# Patient Record
Sex: FEMALE | Race: OTHER | HISPANIC OR LATINO | Employment: UNEMPLOYED | URBAN - METROPOLITAN AREA
[De-identification: names, ages, dates, MRNs, and addresses within clinical notes are randomized per-mention and may not be internally consistent; named-entity substitution may affect disease eponyms.]

---

## 2017-04-25 ENCOUNTER — GENERIC CONVERSION - ENCOUNTER (OUTPATIENT)
Dept: OTHER | Facility: OTHER | Age: 62
End: 2017-04-25

## 2017-05-31 ENCOUNTER — GENERIC CONVERSION - ENCOUNTER (OUTPATIENT)
Dept: OTHER | Facility: OTHER | Age: 62
End: 2017-05-31

## 2018-04-19 LAB
ABSOL LYMPHOCYTES (HISTORICAL): 2.6 K/UL (ref 0.5–4)
ALBUMIN SERPL BCP-MCNC: 4.6 G/DL (ref 3–5.2)
ALP SERPL-CCNC: 125 U/L (ref 43–122)
ALT SERPL W P-5'-P-CCNC: 38 U/L (ref 9–52)
AMORPHOUS MATERIAL (HISTORICAL): NORMAL
ANION GAP SERPL CALCULATED.3IONS-SCNC: 12 MMOL/L (ref 5–14)
AST SERPL W P-5'-P-CCNC: 23 U/L (ref 14–36)
BACTERIA UR QL AUTO: NORMAL
BASOPHILS # BLD AUTO: 0 % (ref 0–1)
BASOPHILS # BLD AUTO: 0 K/UL (ref 0–0.1)
BILIRUB SERPL-MCNC: 0.4 MG/DL
BILIRUB UR QL STRIP: NEGATIVE MG/DL
BUN SERPL-MCNC: 10 MG/DL (ref 5–25)
CALCIUM SERPL-MCNC: 9.8 MG/DL (ref 8.4–10.2)
CASTS/CASTS TYPE (HISTORICAL): NORMAL /LPF
CHLORIDE SERPL-SCNC: 103 MEQ/L (ref 97–108)
CLARITY UR: CLEAR
CO2 SERPL-SCNC: 29 MMOL/L (ref 22–30)
COLOR UR: ABNORMAL
CREATINE, SERUM (HISTORICAL): 0.6 MG/DL (ref 0.6–1.2)
CRYSTAL TYPE (HISTORICAL): NORMAL /HPF
DEPRECATED RDW RBC AUTO: 13.7 %
EGFR (HISTORICAL): >60 ML/MIN/1.73 M2
EOSINOPHIL # BLD AUTO: 0.1 K/UL (ref 0–0.4)
EOSINOPHIL NFR BLD AUTO: 1 % (ref 0–6)
ERYTHROCYTE SEDIMENTATION RATE (HISTORICAL): 58 MM (ref 1–20)
GLUCOSE SERPL-MCNC: 91 MG/DL (ref 70–99)
GLUCOSE UR STRIP-MCNC: NEGATIVE MG/DL
HCT VFR BLD AUTO: 43 % (ref 36–46)
HGB BLD-MCNC: 14.2 G/DL (ref 12–16)
HGB UR QL STRIP.AUTO: ABNORMAL
KETONES UR STRIP-MCNC: NEGATIVE MG/DL
LEUKOCYTE ESTERASE UR QL STRIP: ABNORMAL
LYMPHOCYTES NFR BLD AUTO: 30 % (ref 25–45)
MCH RBC QN AUTO: 28.6 PG (ref 26–34)
MCHC RBC AUTO-ENTMCNC: 33.1 % (ref 31–36)
MCV RBC AUTO: 87 FL (ref 80–100)
MONOCYTES # BLD AUTO: 0.5 K/UL (ref 0.2–0.9)
MONOCYTES NFR BLD AUTO: 6 % (ref 1–10)
MUCOUS THREADS URNS QL MICRO: NORMAL
NEUTROPHILS ABS COUNT (HISTORICAL): 5.4 K/UL (ref 1.8–7.8)
NEUTS SEG NFR BLD AUTO: 63 % (ref 45–65)
NITRITE UR QL STRIP: NEGATIVE
NON-SQ EPI CELLS URNS QL MICRO: NORMAL
OTHER STN SPEC: NORMAL
PH UR STRIP.AUTO: 7 [PH] (ref 4.5–8)
PLATELET # BLD AUTO: 351 K/MCL (ref 150–450)
POTASSIUM SERPL-SCNC: 4.3 MEQ/L (ref 3.6–5)
PROT UR STRIP-MCNC: NEGATIVE MG/DL
RBC # BLD AUTO: 4.97 M/MCL (ref 4–5.2)
RBC #/AREA URNS AUTO: 2 /HPF
SODIUM SERPL-SCNC: 143 MEQ/L (ref 137–147)
SP GR UR STRIP.AUTO: 1.01 (ref 1–1.04)
TOTAL PROTEIN (HISTORICAL): 8.6 G/DL (ref 5.9–8.4)
TSH SERPL DL<=0.05 MIU/L-ACNC: 0.74 UIU/ML (ref 0.47–4.68)
UROBILINOGEN UR QL STRIP.AUTO: NEGATIVE MG/DL (ref 0–1)
WBC # BLD AUTO: 8.5 K/MCL (ref 4.5–11)
WBC #/AREA URNS AUTO: 2 /HPF

## 2018-04-20 LAB — GLUCOSE SERPL-MCNC: 110 MG/DL (ref 70–99)

## 2018-04-22 LAB
EST. AVERAGE GLUCOSE BLD GHB EST-MCNC: 123 MG/DL
HBA1C MFR BLD HPLC: 5.9 %

## 2021-12-30 ENCOUNTER — APPOINTMENT (EMERGENCY)
Dept: ULTRASOUND IMAGING | Facility: HOSPITAL | Age: 66
End: 2021-12-30
Payer: MEDICARE

## 2021-12-30 ENCOUNTER — HOSPITAL ENCOUNTER (EMERGENCY)
Facility: HOSPITAL | Age: 66
Discharge: HOME | End: 2021-12-30
Attending: EMERGENCY MEDICINE | Admitting: EMERGENCY MEDICINE
Payer: MEDICARE

## 2021-12-30 VITALS
DIASTOLIC BLOOD PRESSURE: 79 MMHG | HEART RATE: 75 BPM | OXYGEN SATURATION: 98 % | BODY MASS INDEX: 36.36 KG/M2 | WEIGHT: 205.25 LBS | SYSTOLIC BLOOD PRESSURE: 131 MMHG | TEMPERATURE: 98.6 F | RESPIRATION RATE: 18 BRPM

## 2021-12-30 DIAGNOSIS — R10.2 PELVIC PAIN: ICD-10-CM

## 2021-12-30 DIAGNOSIS — R93.89 ABNORMAL FINDING ON ULTRASOUND: ICD-10-CM

## 2021-12-30 DIAGNOSIS — N95.0 POSTMENOPAUSAL BLEEDING: Primary | ICD-10-CM

## 2021-12-30 LAB
ABO GROUP BLD: NORMAL
ALBUMIN SERPL BCP-MCNC: 3.3 G/DL (ref 3.5–5)
ALP SERPL-CCNC: 138 U/L (ref 46–116)
ALT SERPL W P-5'-P-CCNC: 44 U/L (ref 12–78)
ANION GAP SERPL CALCULATED.3IONS-SCNC: 8 MMOL/L (ref 4–13)
APTT PPP: 26 SECONDS (ref 23–37)
AST SERPL W P-5'-P-CCNC: 24 U/L (ref 5–45)
BASOPHILS # BLD AUTO: 0.05 THOUSANDS/ΜL (ref 0–0.1)
BASOPHILS NFR BLD AUTO: 1 % (ref 0–1)
BILIRUB DIRECT SERPL-MCNC: 0.08 MG/DL (ref 0–0.2)
BILIRUB SERPL-MCNC: 0.32 MG/DL (ref 0.2–1)
BLD GP AB SCN SERPL QL: NEGATIVE
BUN SERPL-MCNC: 15 MG/DL (ref 5–25)
CALCIUM SERPL-MCNC: 8.6 MG/DL (ref 8.3–10.1)
CHLORIDE SERPL-SCNC: 107 MMOL/L (ref 100–108)
CO2 SERPL-SCNC: 29 MMOL/L (ref 21–32)
CREAT SERPL-MCNC: 0.84 MG/DL (ref 0.6–1.3)
EOSINOPHIL # BLD AUTO: 0.12 THOUSAND/ΜL (ref 0–0.61)
EOSINOPHIL NFR BLD AUTO: 2 % (ref 0–6)
ERYTHROCYTE [DISTWIDTH] IN BLOOD BY AUTOMATED COUNT: 12.8 % (ref 11.6–15.1)
GFR SERPL CREATININE-BSD FRML MDRD: 73 ML/MIN/1.73SQ M
GLUCOSE SERPL-MCNC: 141 MG/DL (ref 65–140)
HCT VFR BLD AUTO: 43.1 % (ref 34.8–46.1)
HGB BLD-MCNC: 13.6 G/DL (ref 11.5–15.4)
IMM GRANULOCYTES # BLD AUTO: 0.01 THOUSAND/UL (ref 0–0.2)
IMM GRANULOCYTES NFR BLD AUTO: 0 % (ref 0–2)
INR PPP: 0.94 (ref 0.84–1.19)
LYMPHOCYTES # BLD AUTO: 1.83 THOUSANDS/ΜL (ref 0.6–4.47)
LYMPHOCYTES NFR BLD AUTO: 24 % (ref 14–44)
MAGNESIUM SERPL-MCNC: 2 MG/DL (ref 1.6–2.6)
MCH RBC QN AUTO: 28.5 PG (ref 26.8–34.3)
MCHC RBC AUTO-ENTMCNC: 31.6 G/DL (ref 31.4–37.4)
MCV RBC AUTO: 90 FL (ref 82–98)
MONOCYTES # BLD AUTO: 0.58 THOUSAND/ΜL (ref 0.17–1.22)
MONOCYTES NFR BLD AUTO: 8 % (ref 4–12)
NEUTROPHILS # BLD AUTO: 5.1 THOUSANDS/ΜL (ref 1.85–7.62)
NEUTS SEG NFR BLD AUTO: 65 % (ref 43–75)
NRBC BLD AUTO-RTO: 0 /100 WBCS
PLATELET # BLD AUTO: 312 THOUSANDS/UL (ref 149–390)
PMV BLD AUTO: 9.5 FL (ref 8.9–12.7)
POTASSIUM SERPL-SCNC: 4 MMOL/L (ref 3.5–5.3)
PROT SERPL-MCNC: 7.7 G/DL (ref 6.4–8.2)
PROTHROMBIN TIME: 12.3 SECONDS (ref 11.6–14.5)
RBC # BLD AUTO: 4.78 MILLION/UL (ref 3.81–5.12)
RH BLD: POSITIVE
SODIUM SERPL-SCNC: 144 MMOL/L (ref 136–145)
SPECIMEN EXPIRATION DATE: NORMAL
WBC # BLD AUTO: 7.69 THOUSAND/UL (ref 4.31–10.16)

## 2021-12-30 PROCEDURE — 76830 TRANSVAGINAL US NON-OB: CPT

## 2021-12-30 PROCEDURE — 99284 EMERGENCY DEPT VISIT MOD MDM: CPT | Performed by: EMERGENCY MEDICINE

## 2021-12-30 PROCEDURE — 85025 COMPLETE CBC W/AUTO DIFF WBC: CPT | Performed by: EMERGENCY MEDICINE

## 2021-12-30 PROCEDURE — 85610 PROTHROMBIN TIME: CPT | Performed by: EMERGENCY MEDICINE

## 2021-12-30 PROCEDURE — 96360 HYDRATION IV INFUSION INIT: CPT

## 2021-12-30 PROCEDURE — 76856 US EXAM PELVIC COMPLETE: CPT

## 2021-12-30 PROCEDURE — 80076 HEPATIC FUNCTION PANEL: CPT | Performed by: EMERGENCY MEDICINE

## 2021-12-30 PROCEDURE — 36415 COLL VENOUS BLD VENIPUNCTURE: CPT | Performed by: EMERGENCY MEDICINE

## 2021-12-30 PROCEDURE — 83735 ASSAY OF MAGNESIUM: CPT | Performed by: EMERGENCY MEDICINE

## 2021-12-30 PROCEDURE — 96361 HYDRATE IV INFUSION ADD-ON: CPT

## 2021-12-30 PROCEDURE — 99284 EMERGENCY DEPT VISIT MOD MDM: CPT

## 2021-12-30 PROCEDURE — 85730 THROMBOPLASTIN TIME PARTIAL: CPT | Performed by: EMERGENCY MEDICINE

## 2021-12-30 PROCEDURE — 86850 RBC ANTIBODY SCREEN: CPT | Performed by: EMERGENCY MEDICINE

## 2021-12-30 PROCEDURE — 86901 BLOOD TYPING SEROLOGIC RH(D): CPT | Performed by: EMERGENCY MEDICINE

## 2021-12-30 PROCEDURE — 80048 BASIC METABOLIC PNL TOTAL CA: CPT | Performed by: EMERGENCY MEDICINE

## 2021-12-30 PROCEDURE — 86900 BLOOD TYPING SEROLOGIC ABO: CPT | Performed by: EMERGENCY MEDICINE

## 2021-12-30 RX ORDER — NAPROXEN 500 MG/1
500 TABLET ORAL 2 TIMES DAILY WITH MEALS
Qty: 20 TABLET | Refills: 0 | Status: SHIPPED | OUTPATIENT
Start: 2021-12-30 | End: 2022-01-09

## 2021-12-30 RX ADMIN — SODIUM CHLORIDE 1000 ML: 0.9 INJECTION, SOLUTION INTRAVENOUS at 09:29

## 2021-12-30 NOTE — ED PROVIDER NOTES
History  Chief Complaint   Patient presents with    Vaginal Bleeding     pt c/o vaginal pain and bleeding for the past x4 days  pt deneis cp/sob/n/v/d     72 YO female presents for pelvic pain and vaginal bleeding for the last 3 days  Pt states pain has been cramping, constant, radiating around to the back, no known exacerbating or alleviating factors  Pt has been trying acetaminophen without alleviation of her symptoms  She has Hx of tubal ligation  Pt notes bleeding has been generally light, constant  She denies rectal bleeding  Pt denies CP/SOB/F/C/N/V/D/C, no dysuria, burning on urination or blood in urine  History provided by:  Patient and relative   used: Yes    Vaginal Bleeding  Quality:  Spotting  Severity:  Mild  Onset quality:  Gradual  Duration:  3 days  Timing:  Constant  Progression:  Unchanged  Chronicity:  New  Menstrual history:  Postmenopausal  Context: spontaneously    Relieved by:  Nothing  Worsened by:  Nothing  Ineffective treatments:  Acetaminophen  Associated symptoms: abdominal pain    Associated symptoms: no dizziness, no dysuria, no fatigue, no fever and no nausea    Abdominal pain:     Location:  Suprapubic    Quality: cramping      Severity:  Moderate    Onset quality:  Gradual    Duration:  3 days    Timing:  Constant    Progression:  Unchanged      None       Past Medical History:   Diagnosis Date    Diabetes mellitus ([facility] [address] )        Past Surgical History:   Procedure Laterality Date    KNEE SURGERY Left        History reviewed  No pertinent family history  I have reviewed and agree with the history as documented      E-Cigarette/Vaping    E-Cigarette Use Never User      E-Cigarette/Vaping Substances     Social History     Tobacco Use    Smoking status: Never Smoker    Smokeless tobacco: Never Used   Vaping Use    Vaping Use: Never used   Substance Use Topics    Alcohol use: Not Currently    Drug use: Never       Review of Systems   Constitutional: Negative for chills, fatigue and fever  HENT: Negative for dental problem  Eyes: Negative for visual disturbance  Respiratory: Negative for shortness of breath  Cardiovascular: Negative for chest pain  Gastrointestinal: Positive for abdominal pain  Negative for diarrhea, nausea and vomiting  Genitourinary: Positive for vaginal bleeding  Negative for dysuria and frequency  Musculoskeletal: Negative for arthralgias  Skin: Negative for rash  Neurological: Negative for dizziness, weakness and light-headedness  Psychiatric/Behavioral: Negative for agitation, behavioral problems and confusion  All other systems reviewed and are negative  Physical Exam  Physical Exam  Vitals and nursing note reviewed  Constitutional:       Appearance: Normal appearance  HENT:      Head: Normocephalic and atraumatic  Eyes:      Extraocular Movements: Extraocular movements intact  Conjunctiva/sclera: Conjunctivae normal    Cardiovascular:      Rate and Rhythm: Normal rate  Pulmonary:      Effort: Pulmonary effort is normal    Abdominal:      General: There is no distension  Musculoskeletal:         General: Normal range of motion  Cervical back: Normal range of motion  Skin:     Findings: No rash  Neurological:      General: No focal deficit present  Mental Status: She is alert  Cranial Nerves: No cranial nerve deficit     Psychiatric:         Mood and Affect: Mood normal          Vital Signs  ED Triage Vitals [12/30/21 0826]   Temperature Pulse Respirations Blood Pressure SpO2   98 6 °F (37 °C) 89 18 151/79 97 %      Temp Source Heart Rate Source Patient Position - Orthostatic VS BP Location FiO2 (%)   Oral Monitor Sitting Right arm --      Pain Score       5           Vitals:    12/30/21 0826 12/30/21 1132   BP: 151/79 131/79   Pulse: 89 75   Patient Position - Orthostatic VS: Sitting Lying         Visual Acuity      ED Medications  Medications   sodium chloride 0 9 % bolus 1,000 mL (0 mL Intravenous Stopped 12/30/21 1132)       Diagnostic Studies  Results Reviewed     Procedure Component Value Units Date/Time    Basic metabolic panel [[id number]]  (Abnormal) Collected: 12/30/21 0929    Lab Status: Final result Specimen: Blood from Arm, Right Updated: 12/30/21 1012     Sodium 144 mmol/L      Potassium 4 0 mmol/L      Chloride 107 mmol/L      CO2 29 mmol/L      ANION GAP 8 mmol/L      BUN 15 mg/dL      Creatinine 0 84 mg/dL      Glucose 141 mg/dL      Calcium 8 6 mg/dL      eGFR 73 ml/min/1 73sq m     Narrative:      [place] guidelines for Chronic Kidney Disease (CKD):     Stage 1 with normal or high GFR (GFR > 90 mL/min/1 73 square meters)    Stage 2 Mild CKD (GFR = 60-89 mL/min/1 73 square meters)    Stage 3A Moderate CKD (GFR = 45-59 mL/min/1 73 square meters)    Stage 3B Moderate CKD (GFR = 30-44 mL/min/1 73 square meters)    Stage 4 Severe CKD (GFR = 15-29 mL/min/1 73 square meters)    Stage 5 End Stage CKD (GFR <15 mL/min/1 73 square meters)  Note: GFR calculation is accurate only with a steady state creatinine    Hepatic function panel [[id number]]  (Abnormal) Collected: 12/30/21 0929    Lab Status: Final result Specimen: Blood from Arm, Right Updated: 12/30/21 1012     Total Bilirubin 0 32 mg/dL      Bilirubin, Direct 0 08 mg/dL      Alkaline Phosphatase 138 U/L      AST 24 U/L      ALT 44 U/L      Total Protein 7 7 g/dL      Albumin 3 3 g/dL     Magnesium [[id number]]  (Normal) Collected: 12/30/21 0929    Lab Status: Final result Specimen: Blood from Arm, Right Updated: 12/30/21 1012     Magnesium 2 0 mg/dL     Protime-INR [[id number]]  (Normal) Collected: 12/30/21 0929    Lab Status: Final result Specimen: Blood from Arm, Right Updated: 12/30/21 0957     Protime 12 3 seconds      INR 0 94    APTT [[id number]]  (Normal) Collected: 12/30/21 0929    Lab Status: Final result Specimen: Blood from Arm, Right Updated: 12/30/21 0957     PTT 26 seconds CBC and differential [[id number]] Collected: 12/30/21 0929    Lab Status: Final result Specimen: Blood from Arm, Right Updated: 12/30/21 0939     WBC 7 69 Thousand/uL      RBC 4 78 Million/uL      Hemoglobin 13 6 g/dL      Hematocrit 43 1 %      MCV 90 fL      MCH 28 5 pg      MCHC 31 6 g/dL      RDW 12 8 %      MPV 9 5 fL      Platelets 739 Thousands/uL      nRBC 0 /100 WBCs      Neutrophils Relative 65 %      Immat GRANS % 0 %      Lymphocytes Relative 24 %      Monocytes Relative 8 %      Eosinophils Relative 2 %      Basophils Relative 1 %      Neutrophils Absolute 5 10 Thousands/µL      Immature Grans Absolute 0 01 Thousand/uL      Lymphocytes Absolute 1 83 Thousands/µL      Monocytes Absolute 0 58 Thousand/µL      Eosinophils Absolute 0 12 Thousand/µL      Basophils Absolute 0 05 Thousands/µL                  US pelvis complete w transvaginal   Final Result by Wanda Kessler MD (12/30 1214)       Thin endometrium with small amount of nonspecific endometrial fluid  Nonvisualization of ovaries  Workstation performed: [id number]                    Procedures  Procedures         ED Course  ED Course as of 12/30/21 1306   Thu Dec 30, 2021   1242 Spoke with assistance of  regarding results  Pt's hemoglobin level was ok, the ultrasound did not show any masses, did display a thin endometrium with some fluid  Explained this test does not rule out cancer and patient will require follow up with OB  Pt states she does have [facility] OB [facility]; [facility]  Asking patient if she has any additional questions or concerns, she denies these  MDM  Number of Diagnoses or Management Options  Abnormal finding on ultrasound: new and requires workup  Pelvic pain: new and requires workup  Postmenopausal bleeding: new and requires workup  Diagnosis management comments: 1   Vaginal bleeding - Postmenopausal bleeding with cramping discomfort  Will check CBC for anemia, metabolic panel for electrolyte abnormalities and dehydration, PT/PTT  Will order U/S of pelvis as there is concern for CA  Amount and/or Complexity of Data Reviewed  Clinical lab tests: ordered and reviewed  Tests in the radiology section of CPT®: ordered and reviewed  Obtain history from someone other than the patient: yes  Independent visualization of images, tracings, or specimens: yes    Patient Progress  Patient progress: stable      Disposition  Final diagnoses:   Postmenopausal bleeding   Pelvic pain   Abnormal finding on ultrasound     Time reflects when diagnosis was documented in both MDM as applicable and the Disposition within this note     Time User Action Codes Description Comment    12/30/2021 12:46 PM Wallene Talha E Add [N95 0] Postmenopausal bleeding     12/30/2021 12:47 PM Wallene Talha E Add [R10 2] Pelvic pain     12/30/2021 12:47 PM Wallene Talha E Add [R93 89] Abnormal finding on ultrasound       ED Disposition     ED Disposition Condition Date/Time Comment    Discharge Stable Thu Dec 30, 2021 12:46 PM James Proper discharge to home/self care  Follow-up Information     Follow up With Specialties Details Why Contact Info Additional [address] [facility]   [address]  [address] [facility] [address]  [address] [place] [address], [address], [address] [place] [address], [address] [phone]          Patient's Medications   Discharge Prescriptions    NAPROXEN (NAPROSYN) 500 MG TABLET    Take 1 tablet (500 mg total) by mouth 2 (two) times a day with meals for 10 days       Start Date: 12/30/2021End Date: 1/9/2022       Order Dose: 500 mg       Quantity: 20 tablet    Refills: 0       No discharge procedures on file      PDMP Review     None          ED Provider  Electronically Signed by           Eliseo Ro Manuel French MD  12/30/21 3499

## 2021-12-30 NOTE — DISCHARGE INSTRUCTIONS
Your hemoglobin level was alright, the bleeding is not severe enough to make you anemic  The ultrasound showed mild thickening of the endometrium, it did not show any masses  This ultrasound does not rule out cancer  It is still necessary to follow up with gynecology for further evaluation and management, there are other studies that can be done to evaluate this bleeding  You can follow up with [facility] for your care  Additionally, the number for [facility] has been provided in these discharge instructions  You can call to set up an appointment  Take the Naprosyn twice daily for the next 5-10 days  Trimble nivel de hemoglobina estaba asya, el sangrado no es lo suficientemente severo yanira para causarle anemia  La ecografía mostró un leve engrosamiento del endometrio, no mostró masas  Esta ecografía no descarta el cáncer  Aún es necesario hacer un seguimiento con ginecología para dana mayor evaluación y [place], hay otros estudios que se pueden hacer para evaluar stephanie sangrado  Puede hacer un seguimiento con [facility] para trimble atención  Además, el número de ginecología de [facility] se ha proporcionado en estas instrucciones de giovanna  Puede llamar para programar dana alethea  [place] Naprosyn dos veces al día jacob los próximos 5 a 10 días

## 2022-01-10 ENCOUNTER — TELEPHONE (OUTPATIENT)
Dept: OBGYN CLINIC | Facility: CLINIC | Age: 67
End: 2022-01-10

## 2022-01-10 NOTE — TELEPHONE ENCOUNTER
New patient to us, was at the Ed on 12/30/2021 with vaginal bleeding and pelvic pain  Still having pelvic pain

## 2022-01-11 ENCOUNTER — OFFICE VISIT (OUTPATIENT)
Dept: OBGYN CLINIC | Facility: CLINIC | Age: 67
End: 2022-01-11

## 2022-01-11 VITALS
HEART RATE: 81 BPM | WEIGHT: 212 LBS | SYSTOLIC BLOOD PRESSURE: 168 MMHG | BODY MASS INDEX: 37.55 KG/M2 | DIASTOLIC BLOOD PRESSURE: 93 MMHG

## 2022-01-11 DIAGNOSIS — N95.0 POSTMENOPAUSAL BLEEDING: Primary | ICD-10-CM

## 2022-01-11 DIAGNOSIS — N93.9 ABNORMAL UTERINE BLEEDING: ICD-10-CM

## 2022-01-11 LAB — SL AMB POCT URINE HCG: NEGATIVE

## 2022-01-11 PROCEDURE — 99213 OFFICE O/P EST LOW 20 MIN: CPT | Performed by: OBSTETRICS & GYNECOLOGY

## 2022-01-11 PROCEDURE — 88305 TISSUE EXAM BY PATHOLOGIST: CPT | Performed by: PATHOLOGY

## 2022-01-11 PROCEDURE — 81025 URINE PREGNANCY TEST: CPT | Performed by: OBSTETRICS & GYNECOLOGY

## 2022-01-11 NOTE — PROGRESS NOTES
Endometrial biopsy    Date/Time: 1/11/2022 9:08 AM  Performed by: Bret Acuna MD  Authorized by: Bret Acuna MD   [place] Protocol:  Procedure performed by:  Consent: Verbal consent obtained  Written consent obtained  Consent given by: patient  Time out: Immediately prior to procedure a "time out" was called to verify the correct patient, procedure, equipment, support staff and site/side marked as required  Timeout called at: 1/11/2022 9:00 AM   Patient understanding: patient states understanding of the procedure being performed  Patient consent: the patient's understanding of the procedure matches consent given  Procedure consent: procedure consent matches procedure scheduled  Relevant documents: relevant documents present and verified  Test results: test results available and properly labeled  Site marked: the operative site was not marked  Radiology Images displayed and confirmed  If images not available, report reviewed: imaging studies available  Patient identity confirmed: verbally with patient      Indication:     Indications: Post-menopausal bleeding      Chronicity of post-menopausal bleeding:  New    Progression of post-menopausal bleeding:  Resolved  Procedure:     Procedure: endometrial biopsy with P[place]      A bivalve speculum was placed in the vagina: yes      Cervix cleaned and prepped: yes      A paracervical block was performed: no      An intracervical block was performed: no      The cervix was dilated: no      Uterus sounded: yes      Uterus sound depth (cm):  8    Specimen collected: specimen collected and sent to pathology      Patient tolerated procedure well with no complications: yes    Findings:     Uterus size:  Non-gravid    Cervix: normal      Adnexa: normal      Postmenopausal bleeding  - Patient visit ED with complaints of pelvic pain and vaginal bleeding  She is menopausal last 15 years and this is first episode of bleeding   She is sexually active with     - PAP smear 2021 negative, Mammogram 2021 normal and colonoscopy, up to date, grossly normal  - EMB recommended to rule out endometrial cancer, consent obtained, pregnancy test negative and procedure performed      Jose Francisco Norman MD  OBGYN, PGY-4  1/11/2022  9:10 AM

## 2022-01-11 NOTE — ASSESSMENT & PLAN NOTE
- Patient visit ED with complaints of pelvic pain and vaginal bleeding  She is menopausal last 15 years and this is first episode of bleeding   She is sexually active with     - PAP smear 2021 negative, Mammogram 2021 normal and colonoscopy, up to date, grossly normal  - EMB recommended to rule out endometrial cancer, consent obtained, pregnancy test negative and procedure performed

## 2022-01-31 ENCOUNTER — TELEPHONE (OUTPATIENT)
Dept: OBGYN CLINIC | Facility: CLINIC | Age: 67
End: 2022-01-31

## 2022-05-04 ENCOUNTER — TELEPHONE (OUTPATIENT)
Dept: OBGYN CLINIC | Facility: CLINIC | Age: 67
End: 2022-05-04

## 2022-09-29 ENCOUNTER — TELEPHONE (OUTPATIENT)
Dept: PSYCHIATRY | Facility: CLINIC | Age: 67
End: 2022-09-29

## 2022-09-29 NOTE — TELEPHONE ENCOUNTER
Pt  Was looking for services  Let pt know that we couldn't take her due to her insurance  Has just Medicare Part B only

## 2023-07-31 ENCOUNTER — TELEPHONE (OUTPATIENT)
Dept: OBGYN CLINIC | Facility: CLINIC | Age: 68
End: 2023-07-31

## 2024-02-27 ENCOUNTER — OFFICE VISIT (OUTPATIENT)
Dept: OBGYN CLINIC | Facility: CLINIC | Age: 69
End: 2024-02-27

## 2024-02-27 VITALS
BODY MASS INDEX: 34.35 KG/M2 | WEIGHT: 201.2 LBS | HEART RATE: 89 BPM | HEIGHT: 64 IN | SYSTOLIC BLOOD PRESSURE: 160 MMHG | DIASTOLIC BLOOD PRESSURE: 85 MMHG

## 2024-02-27 DIAGNOSIS — R30.0 DYSURIA: ICD-10-CM

## 2024-02-27 DIAGNOSIS — R10.2 PELVIC PAIN: Primary | ICD-10-CM

## 2024-02-27 DIAGNOSIS — R31.29 OTHER MICROSCOPIC HEMATURIA: ICD-10-CM

## 2024-02-27 LAB
SL AMB  POCT GLUCOSE, UA: ABNORMAL
SL AMB LEUKOCYTE ESTERASE,UA: ABNORMAL
SL AMB POCT BILIRUBIN,UA: ABNORMAL
SL AMB POCT BLOOD,UA: ABNORMAL
SL AMB POCT CLARITY,UA: CLEAR
SL AMB POCT COLOR,UA: YELLOW
SL AMB POCT KETONES,UA: ABNORMAL
SL AMB POCT NITRITE,UA: ABNORMAL
SL AMB POCT PH,UA: 7
SL AMB POCT SPECIFIC GRAVITY,UA: 1.01
SL AMB POCT URINE PROTEIN: ABNORMAL
SL AMB POCT UROBILINOGEN: 0.2

## 2024-02-27 PROCEDURE — 87086 URINE CULTURE/COLONY COUNT: CPT | Performed by: OBSTETRICS & GYNECOLOGY

## 2024-02-27 PROCEDURE — 99213 OFFICE O/P EST LOW 20 MIN: CPT | Performed by: OBSTETRICS & GYNECOLOGY

## 2024-02-27 PROCEDURE — 81003 URINALYSIS AUTO W/O SCOPE: CPT | Performed by: OBSTETRICS & GYNECOLOGY

## 2024-02-27 RX ORDER — CLONAZEPAM 0.5 MG/1
TABLET ORAL
COMMUNITY
Start: 2014-07-14

## 2024-02-27 RX ORDER — NITROFURANTOIN 25; 75 MG/1; MG/1
100 CAPSULE ORAL 2 TIMES DAILY
Qty: 14 CAPSULE | Refills: 0 | Status: SHIPPED | OUTPATIENT
Start: 2024-02-27 | End: 2024-03-05

## 2024-02-27 RX ORDER — TRETINOIN 0.5 MG/G
CREAM TOPICAL
COMMUNITY
Start: 2024-02-13

## 2024-02-27 RX ORDER — OMEPRAZOLE 40 MG/1
CAPSULE, DELAYED RELEASE ORAL
COMMUNITY
Start: 2024-02-13

## 2024-02-27 RX ORDER — LISINOPRIL 40 MG/1
40 TABLET ORAL
COMMUNITY
Start: 2023-11-02

## 2024-02-27 RX ORDER — HYDROCHLOROTHIAZIDE 25 MG/1
25 TABLET ORAL DAILY
COMMUNITY
Start: 2024-01-18

## 2024-02-27 RX ORDER — LEVOTHYROXINE SODIUM 0.03 MG/1
25 TABLET ORAL DAILY
COMMUNITY
Start: 2014-03-11

## 2024-02-27 RX ORDER — DULOXETIN HYDROCHLORIDE 30 MG/1
CAPSULE, DELAYED RELEASE ORAL
COMMUNITY
Start: 2024-02-26

## 2024-02-27 RX ORDER — TRAZODONE HYDROCHLORIDE 50 MG/1
TABLET ORAL
COMMUNITY
Start: 2024-02-24

## 2024-02-27 RX ORDER — IBUPROFEN 600 MG/1
600 TABLET ORAL EVERY 6 HOURS PRN
COMMUNITY
Start: 2023-03-23 | End: 2024-03-22

## 2024-02-27 RX ORDER — NITROFURANTOIN 25; 75 MG/1; MG/1
100 CAPSULE ORAL 2 TIMES DAILY
Qty: 14 CAPSULE | Refills: 0 | Status: SHIPPED | OUTPATIENT
Start: 2024-02-27 | End: 2024-02-27

## 2024-02-27 RX ORDER — AMLODIPINE BESYLATE 5 MG/1
5 TABLET ORAL
COMMUNITY
Start: 2023-11-02

## 2024-02-27 RX ORDER — CARBOXYMETHYLCELLULOSE SODIUM 5 MG/ML
SOLUTION/ DROPS OPHTHALMIC
COMMUNITY
Start: 2023-12-02

## 2024-02-27 NOTE — PROGRESS NOTES
"PROBLEM GYNECOLOGICAL VISIT    Riya Morales is a 68 y.o. female who presents today with complaint of pelvic pain.  Her general medical history has been reviewed and she reports it as follows:    Past Medical History:   Diagnosis Date    Diabetes mellitus (HCC)     Hypertension     Thyroid disease      Past Surgical History:   Procedure Laterality Date    KNEE SURGERY Left     US GUIDED THYROID BIOPSY  8/3/2017     OB History          7    Para        Term                AB        Living             SAB        IAB        Ectopic        Multiple        Live Births   7               Social History     Tobacco Use    Smoking status: Never    Smokeless tobacco: Never   Vaping Use    Vaping status: Never Used   Substance Use Topics    Alcohol use: Not Currently    Drug use: Never     Social History     Substance and Sexual Activity   Sexual Activity Yes       Current Outpatient Medications   Medication Instructions    amLODIPine (NORVASC) 5 mg, Oral    clonazePAM (KlonoPIN) 0.5 mg tablet Oral    Diclofenac Sodium (VOLTAREN) 2 g, Apply externally, 2 times daily PRN    DULoxetine (CYMBALTA) 30 mg delayed release capsule     hydroCHLOROthiazide 25 mg, Oral, Daily    ibuprofen (MOTRIN) 600 mg, Oral, Every 6 hours PRN    levothyroxine 25 mcg, Oral, Daily    lisinopril (ZESTRIL) 40 mg, Oral    metFORMIN (GLUCOPHAGE) 500 mg, Oral, 2 times daily    naproxen (NAPROSYN) 500 mg, Oral, 2 times daily with meals    omeprazole (PriLOSEC) 40 MG capsule     Refresh Tears 0.5 % SOLN     traZODone (DESYREL) 50 mg tablet     tretinoin (REFISSA) 0.05 % cream        History of Present Illness:   Patient presents with h/o pelvic pain for the past 2 weeks. Patient states burns with urination.    Review of Systems:  Review of Systems   Genitourinary:  Positive for dysuria and pelvic pain.   All other systems reviewed and are negative.      Physical Exam:  /85   Pulse 89   Ht 5' 4\" (1.626 m)   Wt 91.3 kg (201 lb 3.2 oz)   " BMI 34.54 kg/m²   Physical Exam  Constitutional:       Appearance: Normal appearance.   Genitourinary:      Bladder and urethral meatus normal.      No lesions in the vagina.      Right Labia: No rash, tenderness, lesions or skin changes.     Left Labia: No tenderness, lesions, skin changes or rash.     No vaginal discharge.        Right Adnexa: not tender, not full and no mass present.     Left Adnexa: not tender, not full and no mass present.     No cervical motion tenderness, discharge or lesion.      Uterus is not enlarged or tender.      No uterine mass detected.     No urethral tenderness or mass present.   Neurological:      Mental Status: She is alert.   Vitals reviewed.         Assessment:   1. Pelvic pain   2. Dysuria    Plan:   1. Cultures ordered: urine  2. Macrobid escribed    3. Imaging ordered: pelvic sonogram   4. Return to office 3wks.   5. Patient's depression screening was assessed with a PHQ-2 score of 0. Their PHQ-9 score was 0. Clinically patient does not have depression. No treatment is required.      Reviewed with patient that test results are available in [facility] immediately, but that they will not necessarily be reviewed by me immediately.  Explained that I will review results at my earliest opportunity and contact patient appropriately.

## 2024-02-27 NOTE — PATIENT INSTRUCTIONS
Berny por trimble confianza en nuestro equipo.   Le agradecemos y agradecemos colt comentarios.   Si recibe dana encuesta nuestra, tómese unos momentos para informarnos cómo estamos.   Sinceramente,  Yairmae Toussaint-Foster, DO

## 2024-02-28 LAB — BACTERIA UR CULT: NORMAL

## 2024-03-05 ENCOUNTER — HOSPITAL ENCOUNTER (OUTPATIENT)
Dept: ULTRASOUND IMAGING | Facility: HOSPITAL | Age: 69
Discharge: HOME | End: 2024-03-05
Payer: MEDICARE

## 2024-03-05 DIAGNOSIS — R10.2 PELVIC PAIN: ICD-10-CM

## 2024-03-05 PROCEDURE — 76830 TRANSVAGINAL US NON-OB: CPT

## 2024-03-05 PROCEDURE — 76856 US EXAM PELVIC COMPLETE: CPT

## 2024-03-21 ENCOUNTER — OFFICE VISIT (OUTPATIENT)
Dept: OBGYN CLINIC | Facility: CLINIC | Age: 69
End: 2024-03-21

## 2024-03-21 VITALS
DIASTOLIC BLOOD PRESSURE: 81 MMHG | WEIGHT: 200.2 LBS | HEIGHT: 64 IN | BODY MASS INDEX: 34.18 KG/M2 | HEART RATE: 80 BPM | SYSTOLIC BLOOD PRESSURE: 175 MMHG

## 2024-03-21 DIAGNOSIS — Z71.2 ENCOUNTER TO DISCUSS TEST RESULTS: Primary | ICD-10-CM

## 2024-03-21 PROCEDURE — 99213 OFFICE O/P EST LOW 20 MIN: CPT | Performed by: OBSTETRICS & GYNECOLOGY

## 2024-03-21 NOTE — PROGRESS NOTES
"PROBLEM GYNECOLOGICAL VISIT    Riya Morales is a 68 y.o. female who presents today for follow up s/p pelvic sonogram.  Her general medical history has been reviewed and she reports it as follows:    Past Medical History:   Diagnosis Date    Diabetes mellitus (HCC)     Hypertension     Thyroid disease      Past Surgical History:   Procedure Laterality Date    KNEE SURGERY Left     US GUIDED THYROID BIOPSY  8/3/2017     OB History          7    Para        Term                AB        Living             SAB        IAB        Ectopic        Multiple        Live Births   7               Social History     Tobacco Use    Smoking status: Never    Smokeless tobacco: Never   Vaping Use    Vaping status: Never Used   Substance Use Topics    Alcohol use: Not Currently    Drug use: Never     Social History     Substance and Sexual Activity   Sexual Activity Not Currently       Current Outpatient Medications   Medication Instructions    amLODIPine (NORVASC) 5 mg, Oral    clonazePAM (KlonoPIN) 0.5 mg tablet Oral    Diclofenac Sodium (VOLTAREN) 2 g, Apply externally, 2 times daily PRN    DULoxetine (CYMBALTA) 30 mg delayed release capsule     hydroCHLOROthiazide 25 mg, Oral, Daily    ibuprofen (MOTRIN) 600 mg, Oral, Every 6 hours PRN    levothyroxine 25 mcg, Oral, Daily    lisinopril (ZESTRIL) 40 mg, Oral    metFORMIN (GLUCOPHAGE) 500 mg, Oral, 2 times daily    naproxen (NAPROSYN) 500 mg, Oral, 2 times daily with meals    omeprazole (PriLOSEC) 40 MG capsule     Refresh Tears 0.5 % SOLN     traZODone (DESYREL) 50 mg tablet     tretinoin (REFISSA) 0.05 % cream        History of Present Illness:   Patient presents for follow up s/p pelvic sonogram for pelvic pain which she states has resolved.    Review of Systems:  Review of Systems   All other systems reviewed and are negative.      Physical Exam:  BP (!) 175/81 (BP Location: Left arm, Patient Position: Sitting, Cuff Size: Large)   Pulse 80   Ht 5' 4\" (1.626 m)  "  Wt 90.8 kg (200 lb 3.2 oz)   BMI 34.36 kg/m²   Physical Exam  Constitutional:       Appearance: Normal appearance.   Neurological:      Mental Status: She is alert.   Vitals reviewed.       Discussion:  Discuss with patient sonogram consist of bulbous uterus with heterogeneous myometrium suggestion of adenomyosis and unable to visualize the bilateral ovaries due postmenopausal.  Patient is happy with the results that there was nothing suspicious for cancer.     Assessment:   1. Adenomyosis   2. Resolution of pelvic pain    Plan:   1. No necessary interaction    2. Return to office prn.       Reviewed with patient that test results are available in [employer] immediately, but that they will not necessarily be reviewed by me immediately.  Explained that I will review results at my earliest opportunity and contact patient appropriately.

## 2024-03-24 NOTE — PATIENT INSTRUCTIONS
Berny por trimble confianza en nuestro equipo.   Le agradecemos y agradecemos colt comentarios.   Si recibe dana encuesta nuestra, tómese unos momentos para informarnos cómo estamos.   Sinceramente,  Yairmae Toussaint-Foster, DO    Patient Education